# Patient Record
(demographics unavailable — no encounter records)

---

## 2018-02-21 NOTE — CT
CT OF THE ABDOMEN WITH IV CONTRAST:

 

INDICATION: 

History of ventral excisional hernia without gangrene.

 

FINDINGS: 

There is mild bibasilar atelectasis.  The gallbladder is surgically absent.  There is prominent diver
ticulum involving the 2nd stage of the duodenum measuring 5.7 cm.  The visualized pancreas and adrena
l glands are normal-appearing.  The spleen is normal-appearing.

 

Since the comparison in 2008, there has been interval reduction of the hiatal hernia with performance
 of a fundoplication procedure.  There is radiopaque patch-like material surrounding the fundoplicati
on site.

 

No free fluid is evident.  There are scattered vascular calcifications.  The visualized kidneys are w
ithin normal limits.  No lymphadenopathy is evident.

 

There is a 5.4 cm ventral abdominal wall hernia seen above the level of the umbilicus containing both
 omental fat in addition to a nonobstructed loop of transverse colon.  

 

No free fluid is identified.

 

There is scattered degenerative and osteoarthritic change.

 

IMPRESSION: 

1.  A large mouth ventral abdominal wall hernia containing nonobstructed loop of transverse colon as 
well as omental mesenteric fat.

 

2.  Postsurgical change of fundoplication and hiatal hernia reduction.

 

3.  Other chronic findings as above.

 

POS: [facility]

## 2018-03-05 NOTE — RAD
PA AND LATERAL VIEWS OF CHEST:

 

Date:  03/05/18 

 

HISTORY:  

Ventral incisional hernia, preoperative evaluation. 

 

FINDINGS:

 

Comparison made with exam of 03/17/08. 

 

The heart size is borderline. Aorta is tortuous. No lobar consolidation, pneumothoraces, or pleural e
ffusions are seen. There is interposition of the colon between the liver and the right hemidiaphragm 
consistent with Chilaiditi's syndrome. There are mild degenerative changes in the spine. 

 

IMPRESSION: 

No radiographic evidence of acute cardiopulmonary process. 

 

 

POS: [facility]

## 2018-03-08 NOTE — EKG
Test Reason : 

Blood Pressure : ***/*** mmHG

Vent. Rate : 064 BPM     Atrial Rate : 064 BPM

   P-R Int : 154 ms          QRS Dur : 092 ms

    QT Int : 396 ms       P-R-T Axes : 015 -17 -32 degrees

   QTc Int : 408 ms

 

Normal sinus rhythm

Inferior infarct , age undetermined

Anterior infarct , age undetermined

Abnormal ECG

When compared with ECG of 14-MAR-2008 21:51,

Anterior infarct is now Present

Inferior infarct is now Present

T wave inversion more evident in Inferior leads

Nonspecific T wave abnormality now evident in Lateral leads

Confirmed by DR. IMAN KENNEDY ([id number]) on 3/8/2018 5:27:53 PM

 

Referred By:  RAFFY           Confirmed By:DR. IMAN KENNEDY

## 2018-03-16 NOTE — OP
DATE OF PROCEDURE:  03/15/2018

 

PREOPERATIVE DIAGNOSIS:  Ventral incisional hernia.

 

POSTOPERATIVE DIAGNOSIS:  Ventral incisional hernia.

 

PROCEDURE PERFORMED:  Robotic assisted repair of ventral incisional hernia.

 

SURGEON:  Jagdeep Lord M.D.

 

ANESTHESIA:  General endotracheal.

 

INDICATIONS:  The patient is an 85-year-old white female.  She underwent a laparotomy for repair of a
 very large hiatal hernia about 10 years ago.  Within the last year or so, she developed a bulge thro
ugh her upper midline abdominal incision.  This is enlarging and uncomfortable and she presents at th
is time requesting repair.  I have recommended robotic-assisted repair.

 

DESCRIPTION OF PROCEDURE:  Informed consent was obtained.  The patient was taken to the operating jacquie
m where general endotracheal anesthesia was obtained with the patient in supine position.  Abdomen wa
s prepped with ChloraPrep and draped in sterile fashion.  Local anesthetic was infiltrated and an 11-
mm incision was created just left of midline infraumbilical.  Veress needle was passed through this i
ncision and pneumoperitoneum established using carbon dioxide up to a pressure of 15 mmHg.  An 11-mm 
trocar port was passed through this same incision.  Laparoscopic camera was passed through this port.
  An 8-mm port was placed under direct vision in the left mid abdomen and in the right lower abdomen.
  I placed an 8 mm robotic port.  The robot was then docked to the camera and the 3 ports and the ope
ration was continued from the robotic console.  There were substantial adhesions to the anterior abdo
amarilis wall and there were incarcerated contents up to the hernia.  Utilizing careful dissection, I mo
bilized the adhesions away from the anterior abdominal wall as I approached the hernia.  I then caref
ully reduced the hernia.  There proved to be at least 4 separate defects.  The largest of these, cano
emeli, was the one seen on CT scan that contained the transverse colon.  The colon was reduced carefull
y to avoid injury.  Once the colon was reduced and all other adhesions to the variety of the hernias 
in this area were identified, the preperitoneal fat surrounding these was also dissected.  The defect
s were identified and measured intra-abdominal.  They had a more or less transverse orientation and t
he width of the defects in continuity was about 6 cm and was about 4-5 cm in the vertical dimension. 
 The falciform ligament was mobilized superiorly to afford room for mesh placement.

 

A #1 nonabsorbable V-Loc suture was obtained and utilizing this, I closed the defect in a to and fro 
fashion, beginning on the right run to the left and then back.  The defects were all closed without d
ifficulty.  The pressure within the abdomen was dropped to 9 mmHg during the closure.

 

A Parietex mesh patch was obtained.  This is a 12-cm [employer].  I trimmed this to the size of a 10 x 12
 cm patch in an oval fashion.  A single stay suture of Vicryl was placed in the center of this.  The 
patch was moistened and passed into the abdominal cavity.  A GraNee needle was used to pull the stay 
suture snug against the anterior abdominal wall.  I then used a bidirectional 2-0 PDS Stratafix sutur
e to secure the mesh to the anterior abdominal wall, obtaining good fascial bites as I closed around 
the circumference of the mesh patch.  The mesh patch was nicely approximated.  I placed a couple of i
nterrupted sutures of 3-0 Vicryl in the center of the patch to the fascia as well.  The fascial defec
t at the 2 larger port sites were closed with 0 Vicryl suture using a GraNee needle.  All ports and i
nstruments were removed under direct vision.  Pneumoperitoneum was carefully evacuated.  Quarter perc
ent Marcaine with epinephrine was infiltrated in each port site.  Skin edges approximated with 4-0 Mo
nocryl subcuticular suture.  Dermabond was placed externally.  There were no complications.  The andrea
ent tolerated the procedure well and was taken to the recovery room in stable condition.

## 2018-06-01 NOTE — CT
CT BRAIN WITHOUT CONTRAST:

 

HISTORY: 

Left eye vision changes.

 

FINDINGS: 

There are changes of cortical atrophy and chronic small-vessel ischemic disease.  The ventricular siz
e is appropriate and the basilar cisterns are patent.  Old lacunar infarcts are seen in the basal raj
glia.  No evidence of acute infarct, hemorrhage, midline shift, or abnormal extraaxial fluid collecti
ons is seen.  The bony calvarium is intact.  The visualized paranasal sinuses and mastoid air cells a
re well aerated.

 

IMPRESSION: 

No CT evidence of acute intracranial process.

 

POS: [facility]

## 2019-04-20 NOTE — CT
CT BRAIN:

4/20/19

 

HISTORY: 

Patient unable to think properly. Altered mental status. 

 

Noncontrast enhanced CT images of the brain obtained on 4/20/19.

 

Comparison made to a previous exam from 6/1/18.

 

CT images of the brain demonstrate cortical atrophy and deep white matter ischemic changes. No eviden
ce of acute intracranial masses, hemorrhages, strokes, or contusions seen. 

 

IMPRESSION: 

No evidence of acute intracranial pathology seen. 

 

POS: [facility]